# Patient Record
Sex: FEMALE | Race: WHITE
[De-identification: names, ages, dates, MRNs, and addresses within clinical notes are randomized per-mention and may not be internally consistent; named-entity substitution may affect disease eponyms.]

---

## 2017-07-05 ENCOUNTER — HOSPITAL ENCOUNTER (EMERGENCY)
Dept: HOSPITAL 38 - CC.ED | Age: 42
Discharge: HOME | End: 2017-07-05
Payer: COMMERCIAL

## 2017-07-05 VITALS — SYSTOLIC BLOOD PRESSURE: 133 MMHG | DIASTOLIC BLOOD PRESSURE: 84 MMHG
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
## 2017-07-05 DIAGNOSIS — Z88.1: ICD-10-CM

## 2017-07-05 DIAGNOSIS — Z90.710: ICD-10-CM

## 2017-07-05 DIAGNOSIS — S92.511A: Primary | ICD-10-CM

## 2017-07-05 PROCEDURE — 73630 X-RAY EXAM OF FOOT: CPT

## 2017-07-05 PROCEDURE — 99283 EMERGENCY DEPT VISIT LOW MDM: CPT

## 2017-07-05 NOTE — EDM.PDOC
ED HPI GENERAL MEDICAL PROBLEM





- General


Chief Complaint: Lower Extremity Injury/Pain


Stated Complaint: "I think I broke my toe"


Time Seen by Provider: 07/05/17 22:10


Source of Information: Reports: Patient


History Limitations: Reports: No Limitations





- History of Present Illness


INITIAL COMMENTS - FREE TEXT/NARRATIVE: 


ELIF IS A 41 YO FEMALE WHO PRESENTS TO THE ER WITH CONCERNS OF A BROKEN TOE. 

STATES SHE ISN'T EXACTLY SURE WHAT HAPPENED AND HER DOG TRIPPED HER. SHE STATES 

IT HAPPENED AROUND 2030 THIS EVENING. SHE STATES IF SHE TRIES TO MOVE HER PINKY 

TOE SHE HAS A LOT OF DISCOMFORT. 


Onset: Today


Duration: Constant


Location: Reports: Lower Extremity, Right


Associated Symptoms: Reports: No Other Symptoms


  ** Right 5-Little toe


Pain Score (Numeric/FACES): 6





- Related Data


 Allergies











Allergy/AdvReac Type Severity Reaction Status Date / Time


 


ciprofloxacin [From Cipro] Allergy  Cannot Verified 12/28/13 15:21





   Remember  


 


ciprofloxacin HCl Allergy  Cannot Verified 12/28/13 15:21





[From Cipro]   Remember  














Past Medical History


Other Musculoskeletal History: right ankle surgery 2014.





- Past Surgical History


Female  Surgical History: Reports: Hysterectomy





Social & Family History





- Tobacco Use


Smoking Status *Q: Never Smoker





Review of Systems





- Review of Systems


Review Of Systems: ROS reveals no pertinent complaints other than HPI.





ED EXAM, GENERAL





- Physical Exam


Exam: See Below


Exam Limited By: No Limitations


General Appearance: Alert, No Apparent Distress


Extremities: Normal Capillary Refill, Other (TENDERNESS WITH PALPATION OVER 5TH 

PHALANGE OF RIGHT FOOT. INCREASED DISCOMFORT WITH MOVEMENT. MILD SWELLING 

NOTED. )


Psychiatric: Normal Affect, Normal Mood


Skin Exam: Warm, Dry, Intact, Normal Color





Course





- Vital Signs


Last Recorded V/S: 


 Last Vital Signs











Temp  97.4 F   07/05/17 21:44


 


Pulse  77   07/05/17 21:44


 


Resp  20   07/05/17 21:44


 


BP  133/84   07/05/17 21:44


 


Pulse Ox  96   07/05/17 21:44














- Orders/Labs/Meds


Orders: 


 Active Orders 24 hr











 Category Date Time Status


 


 Foot Comp Min 3V Rt [CR] Stat Exams  07/05/17 21:58 Taken











Meds: 


Medications














Discontinued Medications














Generic Name Dose Route Start Last Admin





  Trade Name Freq  PRN Reason Stop Dose Admin


 


Hydrocodone Bitart/Acetaminophen  2 packet  07/05/17 22:30  





  Take Home: Acetaminophen/Hydrocod, 2 Tab Pack  PO  07/05/17 22:31  





  ONETIME ONE   


 


Ketorolac Tromethamine  2 packet  07/05/17 22:30  





  Take Home: Ketorolac 10 Mg, 4 Tab Pack  PO  07/05/17 22:31  





  ONETIME ONE   














Departure





- Departure


Time of Disposition: 22:36


Disposition: Home, Self-Care 01


Clinical Impression: 


 Fracture of toe of right foot








- Discharge Information


Instructions:  Toe Fracture, Easy-to-Read


Forms:  ED Department Discharge


Additional Instructions: 


1) Toradol 10mg - 1 tablet every 6 hours as needed for pain





2) Norco 5/325 - 1 tablet every 4 hours as needed for break thru pain





3) Ice 20 minutes at a time, 5 times a day





4) Keep elevated tonight. 











- Problem List & Annotations


(1) Fracture of toe of right foot


SNOMED Code(s): 59570163


   Code(s): S92.911A - UNSP FRACTURE OF RIGHT TOE(S), INIT FOR CLOS FX   Status

: Acute   


Qualifiers: 


   Encounter type: initial encounter   Toe: lesser toe   Fracture type: closed 

  Phalanx: middle 





- My Orders


Last 24 Hours: 


My Active Orders





07/05/17 21:58


Foot Comp Min 3V Rt [CR] Stat 














- Assessment/Plan


Last 24 Hours: 


My Active Orders





07/05/17 21:58


Foot Comp Min 3V Rt [CR] Stat 











Plan: 


Buddy wrapped toes together. Post op shoe applied. See additional instructions.

## 2023-08-28 ENCOUNTER — HOSPITAL ENCOUNTER (EMERGENCY)
Dept: HOSPITAL 38 - CC.ED | Age: 48
Discharge: HOME | End: 2023-08-28
Payer: COMMERCIAL

## 2023-08-28 ENCOUNTER — HOSPITAL ENCOUNTER (EMERGENCY)
Dept: HOSPITAL 38 - CC.ED | Age: 48
Discharge: PSYCHIATRIC OR SUBSTANCE USE FACILITY | End: 2023-08-28
Payer: COMMERCIAL

## 2023-08-28 VITALS — DIASTOLIC BLOOD PRESSURE: 96 MMHG | HEART RATE: 75 BPM | SYSTOLIC BLOOD PRESSURE: 134 MMHG

## 2023-08-28 VITALS — SYSTOLIC BLOOD PRESSURE: 120 MMHG | HEART RATE: 70 BPM | DIASTOLIC BLOOD PRESSURE: 88 MMHG

## 2023-08-28 DIAGNOSIS — F41.9: ICD-10-CM

## 2023-08-28 DIAGNOSIS — Z20.822: ICD-10-CM

## 2023-08-28 DIAGNOSIS — Z88.1: ICD-10-CM

## 2023-08-28 DIAGNOSIS — F32.A: Primary | ICD-10-CM

## 2023-08-28 DIAGNOSIS — F33.1: Primary | ICD-10-CM

## 2023-08-28 LAB
ALBUMIN SERPL-MCNC: 4.5 G/DL (ref 3.4–5)
ALP SERPL-CCNC: 80 U/L (ref 46–116)
ALT SERPL-CCNC: 21 U/L (ref 12–78)
AMPHET UR QL SCN: NEGATIVE
AMPHET UR QL SCN: NEGATIVE
AMPHETAMINES UR QL SCN>500 NG/ML: NEGATIVE
APPEARANCE UR: (no result)
AST SERPL-CCNC: 14 U/L (ref 15–37)
BACTERIA URNS QL MICRO: (no result) /HPF
BARBITURATES UR QL SCN: NEGATIVE
BASOPHILS # BLD AUTO: 0.05 10^3/UL (ref 0–0.5)
BASOPHILS NFR BLD AUTO: 0.9 % (ref 0–1)
BILIRUB SERPL-MCNC: 0.6 MG/DL (ref 0–1)
BILIRUB UR STRIP-MCNC: NEGATIVE MG/DL
BUN SERPL-MCNC: 10 MG/DL (ref 7–18)
CALCIUM SERPL-MCNC: 9.3 MG/DL (ref 8.4–10.1)
CHLORIDE SERPL-SCNC: 102 MEQ/L (ref 98–106)
CO2 SERPL-SCNC: 28 MMOL/L (ref 21–32)
COLOR UR: YELLOW
CREAT CL 24H UR+SERPL-VRATE: (no result) ML/MIN
CREAT SERPL-MCNC: 0.8 MG/DL (ref 0.6–1)
EGFRCR SERPLBLD CKD-EPI 2021: 91 ML/MIN (ref 60–?)
EOSINOPHIL # BLD AUTO: 0.23 10^3/UL (ref 0–1.5)
EOSINOPHIL NFR BLD AUTO: 4 % (ref 0–6)
ETHANOL BLD-MCNC: < 3 MG/DL (ref 0–3)
GLUCOSE SERPL-MCNC: 94 MG/DL (ref 75–99)
GLUCOSE UR STRIP-MCNC: NEGATIVE MG/DL
HCT VFR BLD AUTO: 41.6 % (ref 37–47)
HGB BLD-MCNC: 14.5 G/DL (ref 12–16)
IMM GRANULOCYTES # BLD: 0.01 10^3/UL (ref 0–0.49)
IMM GRANULOCYTES NFR BLD: 0.2 % (ref 0–4.9)
KETONES UR STRIP-MCNC: NEGATIVE MG/DL
LYMPHOCYTES # BLD AUTO: 2.21 10^3/UL (ref 0.6–5)
LYMPHOCYTES NFR BLD AUTO: 38.3 % (ref 24–44)
MAGNESIUM SERPL-MCNC: 2.2 MG/DL (ref 1.8–2.4)
MCH RBC QN AUTO: 29.5 PG (ref 27–32)
MCHC RBC AUTO-ENTMCNC: 34.9 G/DL (ref 32–36)
MCHC RBC AUTO-ENTMCNC: 84.7 FL (ref 83–97)
MDMA UR QL SCN: NEGATIVE
MONOCYTES # BLD AUTO: 0.3 10^3/UL (ref 0–1.5)
MONOCYTES NFR BLD AUTO: 5.2 % (ref 0–10)
NEUTROPHILS # BLD AUTO: 2.97 X10^3/UL (ref 1.8–8)
NEUTROPHILS NFR BLD AUTO: 51.4 % (ref 41–71)
NITRITE UR QL: NEGATIVE
OXYCODONE UR QL SCN: NEGATIVE
PCP UR QL SCN: NEGATIVE
PH UR STRIP: 7 [PH] (ref 4.5–8)
PLATELET # BLD AUTO: 277 10^3/UL (ref 150–400)
POTASSIUM SERPL-SCNC: 4.1 MEQ/L (ref 3.5–5)
PROT SERPL-MCNC: 7.7 G/DL (ref 6.4–8.2)
PROT UR STRIP-MCNC: NEGATIVE MG/DL
RBC # BLD AUTO: 4.91 X10^6/UL (ref 4–5.5)
RBC # URNS HPF: (no result) /HPF (ref 0–5)
RBC UR QL: NEGATIVE
SODIUM SERPL-SCNC: 140 MEQ/L (ref 136–145)
SP GR UR STRIP: 1.01 (ref 1–1.02)
SP GR UR STRIP: 1.01 (ref 1–1.03)
SQUAMOUS #/AREA URNS HPF: (no result) /HPF
TRICYCLICS UR QL SCN: NEGATIVE
UROBILINOGEN UR STRIP-ACNC: 0.2 EU/DL (ref 0.2–1)
WBC # BLD AUTO: 5.8 10^3/UL (ref 4–11)
WBC UR QL: (no result) /HPF (ref 0–5)

## 2023-08-28 PROCEDURE — U0002 COVID-19 LAB TEST NON-CDC: HCPCS

## 2024-10-04 ENCOUNTER — HOSPITAL ENCOUNTER (OUTPATIENT)
Dept: HOSPITAL 38 - CC.SDS | Age: 49
Discharge: HOME | End: 2024-10-04
Attending: FAMILY MEDICINE
Payer: COMMERCIAL

## 2024-10-04 VITALS — DIASTOLIC BLOOD PRESSURE: 76 MMHG | SYSTOLIC BLOOD PRESSURE: 118 MMHG | HEART RATE: 74 BPM

## 2024-10-04 DIAGNOSIS — D12.5: Primary | ICD-10-CM

## 2024-10-04 DIAGNOSIS — F32.A: ICD-10-CM

## 2024-10-04 DIAGNOSIS — D12.7: ICD-10-CM

## 2024-10-04 DIAGNOSIS — F41.9: ICD-10-CM
